# Patient Record
Sex: MALE | Race: WHITE | NOT HISPANIC OR LATINO | Employment: FULL TIME | URBAN - METROPOLITAN AREA
[De-identification: names, ages, dates, MRNs, and addresses within clinical notes are randomized per-mention and may not be internally consistent; named-entity substitution may affect disease eponyms.]

---

## 2024-05-16 ENCOUNTER — TELEPHONE (OUTPATIENT)
Dept: GASTROENTEROLOGY | Facility: CLINIC | Age: 34
End: 2024-05-16
Payer: MEDICAID

## 2024-05-16 NOTE — TELEPHONE ENCOUNTER
----- Message from Maria Dolores Durand LPN sent at 5/16/2024 12:29 PM CDT -----  Regarding: FW: colonoscopy request  Contact: pt    ----- Message -----  From: Joanne Young  Sent: 5/16/2024  12:21 PM CDT  To: Aysha MCCLENDON Staff  Subject: colonoscopy request                              Type:  Needs Medical Advice    Who Called:  pt    Best Call Back Number: [phone]    Additional Information: requesting a return call to schedule.  No symptoms.

## 2024-05-16 NOTE — TELEPHONE ENCOUNTER
Pt is requesting a screening colonoscopy because his insurance will pay for it. He's not having any issues that requires a colonoscopy before 45. He asks me to send his PCP this message.